# Patient Record
Sex: MALE | URBAN - METROPOLITAN AREA
[De-identification: names, ages, dates, MRNs, and addresses within clinical notes are randomized per-mention and may not be internally consistent; named-entity substitution may affect disease eponyms.]

---

## 2022-02-28 ENCOUNTER — APPOINTMENT (RX ONLY)
Dept: URBAN - METROPOLITAN AREA CLINIC 160 | Facility: CLINIC | Age: 77
Setting detail: DERMATOLOGY
End: 2022-02-28

## 2022-02-28 DIAGNOSIS — L82.0 INFLAMED SEBORRHEIC KERATOSIS: ICD-10-CM

## 2022-02-28 PROBLEM — D48.5 NEOPLASM OF UNCERTAIN BEHAVIOR OF SKIN: Status: ACTIVE | Noted: 2022-02-28

## 2022-02-28 PROCEDURE — ? PRESCRIPTION

## 2022-02-28 PROCEDURE — ? ADDITIONAL NOTES

## 2022-02-28 PROCEDURE — 11102 TANGNTL BX SKIN SINGLE LES: CPT

## 2022-02-28 PROCEDURE — ? BIOPSY BY SHAVE METHOD

## 2022-02-28 RX ORDER — MUPIROCIN 20 MG/G
OINTMENT TOPICAL
Qty: 22 | Refills: 0 | Status: ERX

## 2022-02-28 ASSESSMENT — LOCATION DETAILED DESCRIPTION DERM: LOCATION DETAILED: RIGHT LATERAL INFERIOR CHEST

## 2022-02-28 ASSESSMENT — LOCATION ZONE DERM: LOCATION ZONE: TRUNK

## 2022-02-28 ASSESSMENT — LOCATION SIMPLE DESCRIPTION DERM: LOCATION SIMPLE: CHEST

## 2022-02-28 NOTE — PROCEDURE: BIOPSY BY SHAVE METHOD
Detail Level: Detailed
Depth Of Biopsy: dermis
Was A Bandage Applied: Yes
Size Of Lesion In Cm: 2.3
X Size Of Lesion In Cm: 0
Biopsy Type: H and E
Biopsy Method: Personna blade
Anesthesia Type: 1% lidocaine with epinephrine
Anesthesia Volume In Cc (Will Not Render If 0): 0.5
Hemostasis: Electrocautery
Wound Care: Petrolatum
Dressing: bandage
Destruction After The Procedure: No
Type Of Destruction Used: Curettage
Curettage Text: The wound bed was treated with curettage after the biopsy was performed.
Cryotherapy Text: The wound bed was treated with cryotherapy after the biopsy was performed.
Electrodesiccation Text: The wound bed was treated with electrodesiccation after the biopsy was performed.
Electrodesiccation And Curettage Text: The wound bed was treated with electrodesiccation and curettage after the biopsy was performed.
Silver Nitrate Text: The wound bed was treated with silver nitrate after the biopsy was performed.
Lab: [id number]
Path Notes (To The Dermatopathologist): Please rush ***
Consent: Written consent was obtained and risks were reviewed including but not limited to scarring, infection, bleeding, scabbing, incomplete removal, nerve damage and allergy to anesthesia.
Post-Care Instructions: I reviewed with the patient in detail post-care instructions. Patient is to keep the biopsy site dry overnight, and then apply bacitracin twice daily until healed.
Notification Instructions: Patient will be notified of biopsy results. However, patient instructed to call the office if not contacted within 2 weeks.
Billing Type: Third-Party Bill
Information: Selecting Yes will display possible errors in your note based on the variables you have selected. This validation is only offered as a suggestion for you. PLEASE NOTE THAT THE VALIDATION TEXT WILL BE REMOVED WHEN YOU FINALIZE YOUR NOTE. IF YOU WANT TO FAX A PRELIMINARY NOTE YOU WILL NEED TO TOGGLE THIS TO 'NO' IF YOU DO NOT WANT IT IN YOUR FAXED NOTE.

## 2022-03-11 ENCOUNTER — APPOINTMENT (RX ONLY)
Dept: URBAN - METROPOLITAN AREA CLINIC 160 | Facility: CLINIC | Age: 77
Setting detail: DERMATOLOGY
End: 2022-03-11

## 2022-03-11 PROBLEM — C4A.59 MERKEL CELL CARCINOMA OF OTHER PART OF TRUNK: Status: ACTIVE | Noted: 2022-03-11

## 2022-03-11 PROCEDURE — ? PATHOLOGY DISCUSSION

## 2022-03-11 PROCEDURE — 99212 OFFICE O/P EST SF 10 MIN: CPT

## 2022-03-11 PROCEDURE — ? COUNSELING

## 2022-03-11 PROCEDURE — ? ADDITIONAL NOTES

## 2022-03-11 NOTE — PROCEDURE: ADDITIONAL NOTES
Render Risk Assessment In Note?: yes
Detail Level: Simple
Additional Notes: Patient consent was obtained to proceed with the visit and recommended plan of care after discussion of all risks and benefits, including the risks of COVID-19 exposure.
Detail Level: Zone
Additional Notes: Due to pathological findings, pt referred to PCP for referral to a cutaneous oncological surgeon for wide excision, further workup and possible radiation.